# Patient Record
Sex: FEMALE | Race: ASIAN | NOT HISPANIC OR LATINO | URBAN - METROPOLITAN AREA
[De-identification: names, ages, dates, MRNs, and addresses within clinical notes are randomized per-mention and may not be internally consistent; named-entity substitution may affect disease eponyms.]

---

## 2022-02-08 ENCOUNTER — OFFICE (OUTPATIENT)
Dept: URBAN - METROPOLITAN AREA CLINIC 11 | Facility: CLINIC | Age: 27
End: 2022-02-08

## 2022-02-08 VITALS
DIASTOLIC BLOOD PRESSURE: 70 MMHG | HEIGHT: 67 IN | OXYGEN SATURATION: 99 % | SYSTOLIC BLOOD PRESSURE: 150 MMHG | WEIGHT: 121 LBS | HEART RATE: 76 BPM

## 2022-02-08 DIAGNOSIS — D50.9 IRON DEFICIENCY ANEMIA, UNSPECIFIED: ICD-10-CM

## 2022-02-08 PROCEDURE — 99204 OFFICE O/P NEW MOD 45 MIN: CPT | Performed by: NURSE PRACTITIONER

## 2022-02-08 RX ORDER — POLYETHYLENE GLYCOL 3350, SODIUM SULFATE, SODIUM CHLORIDE, POTASSIUM CHLORIDE, ASCORBIC ACID, SODIUM ASCORBATE 140-9-5.2G
KIT ORAL
Qty: 1 | Refills: 0 | Status: ACTIVE
Start: 2022-02-08

## 2022-02-08 NOTE — SERVICENOTES
We discussed her recent laboratory findings with presence of BOSSMAN. We discussed getting an EGD and Colonoscopy for assessment. We discussed the procedures including r/b/a. Her LLQ seems to be musculoskeletal in origin with aggravation by positional changes or walking.

Attending.  Pt was seen and examined.  Chart reviewed and case was d/w Erica ECHOLS.  She is a pleasant  female in NAD.  D/w pt the iron def issues, reviewed her labs, and discussed a dif dx as well as the plan as above.  She agreed to proceed.

## 2022-02-08 NOTE — SERVICEHPINOTES
She notes that she originally was seen by her PCP for abdominal pain in the LLQ. She notes that it does travel across the lower abdomen. The pain has been present for about 3 weeks. Pain seems to be aggravated by positional changes like walking or lifting her legs. When the pain occurs it will last for minutes to hours a time. No changes in pain with bowel movements. No associated symptoms. She had an ultrasound performed with negative findings. david hernandez 
She notes that she was diagnosed with anemia 4 years ago in [place]. She notes that she has normal menstrual cycles for 3-4 days at a time, and it is not heavy. She denies melena. She does pass bright red blood intermittently. She notes that it occurs approximately once a month. She will seen blood in the toilet and with wiping. She typically has a bowel movement once a day without difficulty. She was started on Integra Plus by her PCP, but she has not yet started this medication. david hernandez She notes that she has rare heartburn. No particular aggravating factors or relieving factors. It occurs approximately once a month. She denies dysphagia or epigastric pain/burning. No regular use of NSAIDs.david hernandez 1/25/22: Hct 29.1, Hgb 8.3, MCV 70.3, MCH 20.0, RDW 17.0, iron 14